# Patient Record
Sex: MALE | Race: OTHER
[De-identification: names, ages, dates, MRNs, and addresses within clinical notes are randomized per-mention and may not be internally consistent; named-entity substitution may affect disease eponyms.]

---

## 2019-02-28 PROBLEM — Z00.00 ENCOUNTER FOR PREVENTIVE HEALTH EXAMINATION: Status: ACTIVE | Noted: 2019-02-28

## 2019-03-08 ENCOUNTER — APPOINTMENT (OUTPATIENT)
Dept: HEMATOLOGY ONCOLOGY | Facility: CLINIC | Age: 74
End: 2019-03-08
Payer: MEDICARE

## 2019-03-08 VITALS
WEIGHT: 212 LBS | SYSTOLIC BLOOD PRESSURE: 126 MMHG | HEART RATE: 61 BPM | BODY MASS INDEX: 29.68 KG/M2 | DIASTOLIC BLOOD PRESSURE: 78 MMHG | OXYGEN SATURATION: 85 % | TEMPERATURE: 98 F | RESPIRATION RATE: 16 BRPM | HEIGHT: 71 IN

## 2019-03-08 DIAGNOSIS — Z80.8 FAMILY HISTORY OF MALIGNANT NEOPLASM OF OTHER ORGANS OR SYSTEMS: ICD-10-CM

## 2019-03-08 DIAGNOSIS — Z87.891 PERSONAL HISTORY OF NICOTINE DEPENDENCE: ICD-10-CM

## 2019-03-08 DIAGNOSIS — N40.0 BENIGN PROSTATIC HYPERPLASIA WITHOUT LOWER URINARY TRACT SYMPMS: ICD-10-CM

## 2019-03-08 DIAGNOSIS — Z86.79 PERSONAL HISTORY OF OTHER DISEASES OF THE CIRCULATORY SYSTEM: ICD-10-CM

## 2019-03-08 DIAGNOSIS — Z78.9 OTHER SPECIFIED HEALTH STATUS: ICD-10-CM

## 2019-03-08 DIAGNOSIS — Z87.898 PERSONAL HISTORY OF OTHER SPECIFIED CONDITIONS: ICD-10-CM

## 2019-03-08 DIAGNOSIS — K70.30 ALCOHOLIC CIRRHOSIS OF LIVER W/OUT ASCITES: ICD-10-CM

## 2019-03-08 PROCEDURE — 99205 OFFICE O/P NEW HI 60 MIN: CPT

## 2019-03-08 RX ORDER — PROPRANOLOL HYDROCHLORIDE 20 MG/1
20 TABLET ORAL
Refills: 0 | Status: ACTIVE | COMMUNITY

## 2019-03-08 RX ORDER — TAMSULOSIN HYDROCHLORIDE 0.4 MG/1
0.4 CAPSULE ORAL
Refills: 0 | Status: ACTIVE | COMMUNITY

## 2019-03-08 RX ORDER — ALBUTEROL SULFATE 90 UG/1
108 (90 BASE) AEROSOL, METERED RESPIRATORY (INHALATION)
Refills: 0 | Status: ACTIVE | COMMUNITY

## 2019-03-08 RX ORDER — RANITIDINE HYDROCHLORIDE 150 MG/1
150 CAPSULE ORAL
Refills: 0 | Status: ACTIVE | COMMUNITY

## 2019-03-08 RX ORDER — CHOLECALCIFEROL (VITAMIN D3) 50 MCG
2000 CAPSULE ORAL
Refills: 0 | Status: ACTIVE | COMMUNITY

## 2019-03-08 RX ORDER — UMECLIDINIUM BROMIDE AND VILANTEROL TRIFENATATE 62.5; 25 UG/1; UG/1
62.5-25 POWDER RESPIRATORY (INHALATION)
Refills: 0 | Status: ACTIVE | COMMUNITY

## 2019-03-08 NOTE — PHYSICAL EXAM
[Restricted in physically strenuous activity but ambulatory and able to carry out work of a light or sedentary nature] : Status 1- Restricted in physically strenuous activity but ambulatory and able to carry out work of a light or sedentary nature, e.g., light house work, office work [Normal] : affect appropriate [de-identified] : uses O2

## 2019-03-08 NOTE — HISTORY OF PRESENT ILLNESS
[de-identified] : This is a very pleasant 73-year-old gentleman with the below past medical history who was noted to be thrombocytopenic by his gastroenterologist prior to performing a screening colonoscopy. His history is significant for having developed alcoholic cirrhosis. Endoscopy performed on December 1, 2017 revealed small esophageal varices and mild portal gastropathy. He also underwent a CAT scan of the abdomen and pelvis on 11/2/18 that revealed a spleen measuring 17.1 cm with 2 hypoechoic structures one measuring 2.1 cm and no other one measuring 1.8 cm. These were compared to a CT of the chest dating back to 2014 at which point one of the lesions were visible and appear to be stable from the 2014 scan. He is now referred for the above-mentioned findings. [de-identified] : Mr. Johnson is here today for an initial consultation referred by . Per pt he is here today because there is blood in his urine. He uses O2 2L NC at home.

## 2019-03-08 NOTE — REVIEW OF SYSTEMS
[Fatigue] : fatigue [Vision Problems] : vision problems [Nosebleeds] : nosebleeds [Constipation] : constipation [SOB on Exertion] : shortness of breath during exertion [Negative] : Integumentary [FreeTextEntry3] : glasses [FreeTextEntry4] : occasional, uses oxygen NC [FreeTextEntry8] : frequency [FreeTextEntry1] : Tree Nut allergies

## 2019-03-08 NOTE — REASON FOR VISIT
[Initial Consultation] : an initial consultation [Friend] : friend [FreeTextEntry2] : thrombocytopenia.

## 2019-03-08 NOTE — ASSESSMENT
[FreeTextEntry1] : His thrombocytopenia is likely to be a multifactorial process. Contributing factors including his liver disease with passive congestion leading to splenomegaly and platelet sequestration. Chronic myelosuppression due to alcohol use may need additional factor. I am also unable to rule out a hypoproliferative bone marrow disorder at this time.\par I will obtain a CBC with differential and a peripheral blood smear review.\par I have initiated a complete hematological workup for the above.\par This will include a flow cytometry to rule out a lymphoproliferative disorder accounting for his splenomegaly.\par I will check his coagulation studies along with reflex mixing studies should these be abnormal.\par He will return to office in 2 weeks for followup and to review the above mentioned workup.\par \par Thank you very much for allowing me to participate in this gentleman his medical care. Should you have any questions or concerns please do not hesitate to call me directly.

## 2019-03-19 ENCOUNTER — OTHER (OUTPATIENT)
Age: 74
End: 2019-03-19

## 2019-03-21 ENCOUNTER — APPOINTMENT (OUTPATIENT)
Dept: HEMATOLOGY ONCOLOGY | Facility: CLINIC | Age: 74
End: 2019-03-21
Payer: MEDICARE

## 2019-03-21 VITALS
OXYGEN SATURATION: 90 % | DIASTOLIC BLOOD PRESSURE: 79 MMHG | BODY MASS INDEX: 29.26 KG/M2 | RESPIRATION RATE: 16 BRPM | WEIGHT: 209 LBS | HEIGHT: 71 IN | TEMPERATURE: 98 F | HEART RATE: 56 BPM | SYSTOLIC BLOOD PRESSURE: 136 MMHG

## 2019-03-21 PROCEDURE — 99214 OFFICE O/P EST MOD 30 MIN: CPT

## 2019-03-21 NOTE — REVIEW OF SYSTEMS
[Fatigue] : fatigue [Vision Problems] : vision problems [Nosebleeds] : nosebleeds [SOB on Exertion] : shortness of breath during exertion [Constipation] : constipation [Negative] : Heme/Lymph [FreeTextEntry3] : glasses [FreeTextEntry4] : occasional, uses oxygen NC [FreeTextEntry8] : frequency [FreeTextEntry1] : Tree Nut allergies

## 2019-03-21 NOTE — HISTORY OF PRESENT ILLNESS
[de-identified] : Mr. Johnson is here today for a follow up visit today. He offers no new complaints.  [0 - No Distress] : Distress Level: 0 [de-identified] : This is a very pleasant 73-year-old gentleman with the below past medical history who was noted to be thrombocytopenic by his gastroenterologist prior to performing a screening colonoscopy. His history is significant for having developed alcoholic cirrhosis. Endoscopy performed on December 1, 2017 revealed small esophageal varices and mild portal gastropathy. He also underwent a CAT scan of the abdomen and pelvis on 11/2/18 that revealed a spleen measuring 17.1 cm with 2 hypoechoic structures one measuring 2.1 cm and no other one measuring 1.8 cm. These were compared to a CT of the chest dating back to 2014 at which point one of the lesions were visible and appear to be stable from the 2014 scan. He is now referred for the above-mentioned findings.

## 2019-03-21 NOTE — REASON FOR VISIT
[Initial Consultation] : an initial consultation [Friend] : friend [Follow-Up Visit] : a follow-up visit for [Spouse] : spouse [FreeTextEntry2] : thrombocytopenia.

## 2019-03-21 NOTE — PHYSICAL EXAM
[Restricted in physically strenuous activity but ambulatory and able to carry out work of a light or sedentary nature] : Status 1- Restricted in physically strenuous activity but ambulatory and able to carry out work of a light or sedentary nature, e.g., light house work, office work [Normal] : affect appropriate [de-identified] : uses O2

## 2019-03-21 NOTE — ASSESSMENT
[FreeTextEntry1] : His thrombocytopenia is likely to be a multifactorial process. Contributing factors including his liver disease with passive congestion leading to splenomegaly and platelet sequestration. Chronic myelosuppression due to alcohol use may need additional factor. I am also unable to rule out a hypoproliferative bone marrow disorder at this time.\par I obtained a CBC with differential and a peripheral blood smear review.\par I initiated a complete hematological workup for the above.\par This included a flow cytometry to rule out a lymphoproliferative disorder accounting for his splenomegaly.\par The above w/u was essentially unremarkable.  Of note his transferrin saturation level were very high even with fasting labs.  An HFE analysis was ordered with today's blood work.\par I checked his coagulation studies, which too were within acceptable parameters.\par He may therefore proceed with his planned colonoscopy.\par He will return to office in 4 weeks for followup and labs.

## 2019-04-16 ENCOUNTER — APPOINTMENT (OUTPATIENT)
Dept: HEMATOLOGY ONCOLOGY | Facility: CLINIC | Age: 74
End: 2019-04-16
Payer: MEDICARE

## 2019-04-16 VITALS
WEIGHT: 208 LBS | HEIGHT: 71 IN | SYSTOLIC BLOOD PRESSURE: 163 MMHG | HEART RATE: 53 BPM | OXYGEN SATURATION: 88 % | DIASTOLIC BLOOD PRESSURE: 75 MMHG | RESPIRATION RATE: 18 BRPM | TEMPERATURE: 98 F | BODY MASS INDEX: 29.12 KG/M2

## 2019-04-16 PROCEDURE — 99214 OFFICE O/P EST MOD 30 MIN: CPT

## 2019-04-16 NOTE — HISTORY OF PRESENT ILLNESS
[0 - No Distress] : Distress Level: 0 [de-identified] : Mr. Johnson is here today for a follow up visit today. He offers no new complaints.  [de-identified] : This is a very pleasant 73-year-old gentleman with the below past medical history who was noted to be thrombocytopenic by his gastroenterologist prior to performing a screening colonoscopy. His history is significant for having developed alcoholic cirrhosis. Endoscopy performed on December 1, 2017 revealed small esophageal varices and mild portal gastropathy. He also underwent a CAT scan of the abdomen and pelvis on 11/2/18 that revealed a spleen measuring 17.1 cm with 2 hypoechoic structures one measuring 2.1 cm and no other one measuring 1.8 cm. These were compared to a CT of the chest dating back to 2014 at which point one of the lesions were visible and appear to be stable from the 2014 scan. He is now referred for the above-mentioned findings.

## 2019-04-16 NOTE — REVIEW OF SYSTEMS
[Fatigue] : fatigue [Vision Problems] : vision problems [SOB on Exertion] : shortness of breath during exertion [Nosebleeds] : nosebleeds [Constipation] : constipation [Negative] : Heme/Lymph [FreeTextEntry3] : glasses [FreeTextEntry4] : occasional, uses oxygen NC [FreeTextEntry8] : frequency [FreeTextEntry1] : Tree Nut allergies

## 2019-04-16 NOTE — ASSESSMENT
[FreeTextEntry1] : His thrombocytopenia is likely to be a multifactorial process. Contributing factors including his liver disease with passive congestion leading to splenomegaly and platelet sequestration. Chronic myelosuppression due to alcohol use may need additional factor. I am also unable to rule out a hypoproliferative bone marrow disorder at this time.\par I obtained a CBC with differential and a peripheral blood smear review.\par I initiated a complete hematological workup for the above.\par This included a flow cytometry to rule out a lymphoproliferative disorder accounting for his splenomegaly.\par The above w/u was essentially unremarkable.  Of note his transferrin saturation level were very high even with fasting labs.  An HFE analysis was ordered which was negative.\par If his iron and ferritin continue to be elevated than an MRI of the liver will be ordered for iron content.\par I checked his coagulation studies, which too were within acceptable parameters.\par He may therefore proceed with his planned colonoscopy.\par He will return to office in 4 weeks for followup and labs.

## 2019-04-16 NOTE — PHYSICAL EXAM
[Restricted in physically strenuous activity but ambulatory and able to carry out work of a light or sedentary nature] : Status 1- Restricted in physically strenuous activity but ambulatory and able to carry out work of a light or sedentary nature, e.g., light house work, office work [Normal] : affect appropriate [de-identified] : uses O2

## 2019-05-01 ENCOUNTER — APPOINTMENT (OUTPATIENT)
Dept: HEMATOLOGY ONCOLOGY | Facility: CLINIC | Age: 74
End: 2019-05-01
Payer: MEDICARE

## 2019-05-01 VITALS
WEIGHT: 213 LBS | BODY MASS INDEX: 29.82 KG/M2 | RESPIRATION RATE: 18 BRPM | OXYGEN SATURATION: 91 % | HEIGHT: 71 IN | TEMPERATURE: 98.1 F | DIASTOLIC BLOOD PRESSURE: 71 MMHG | HEART RATE: 55 BPM | SYSTOLIC BLOOD PRESSURE: 131 MMHG

## 2019-05-01 PROCEDURE — 99214 OFFICE O/P EST MOD 30 MIN: CPT

## 2019-05-01 RX ORDER — CHOLECALCIFEROL (VITAMIN D3) 50 MCG
2000 CAPSULE ORAL
Refills: 0 | Status: ACTIVE | COMMUNITY

## 2019-05-01 NOTE — PHYSICAL EXAM
[Restricted in physically strenuous activity but ambulatory and able to carry out work of a light or sedentary nature] : Status 1- Restricted in physically strenuous activity but ambulatory and able to carry out work of a light or sedentary nature, e.g., light house work, office work [Normal] : affect appropriate [de-identified] : uses O2

## 2019-05-01 NOTE — HISTORY OF PRESENT ILLNESS
[0 - No Distress] : Distress Level: 0 [de-identified] : Mr. Johnson is here today for a follow up visit today. He offers complaints of fatigue relieved by oxygen 2LNC.  [de-identified] : This is a very pleasant 74-year-old gentleman with the below past medical history who was noted to be thrombocytopenic by his gastroenterologist prior to performing a screening colonoscopy. His history is significant for having developed alcoholic cirrhosis. Endoscopy performed on December 1, 2017 revealed small esophageal varices and mild portal gastropathy. He also underwent a CAT scan of the abdomen and pelvis on 11/2/18 that revealed a spleen measuring 17.1 cm with 2 hypoechoic structures one measuring 2.1 cm and no other one measuring 1.8 cm. These were compared to a CT of the chest dating back to 2014 at which point one of the lesions were visible and appear to be stable from the 2014 scan. He is now referred for the above-mentioned findings.  He is here for f/u with labs.

## 2019-05-01 NOTE — REVIEW OF SYSTEMS
[Fatigue] : fatigue [Vision Problems] : vision problems [Nosebleeds] : nosebleeds [SOB on Exertion] : shortness of breath during exertion [Shortness Of Breath] : shortness of breath [Negative] : Gastrointestinal [FreeTextEntry6] : 2L NC oxygen [FreeTextEntry3] : glasses [FreeTextEntry4] : from oxygen. [FreeTextEntry1] : Tree Nut allergies [FreeTextEntry8] : frequency

## 2019-05-29 ENCOUNTER — APPOINTMENT (OUTPATIENT)
Dept: HEMATOLOGY ONCOLOGY | Facility: CLINIC | Age: 74
End: 2019-05-29
Payer: MEDICARE

## 2019-05-29 VITALS
RESPIRATION RATE: 18 BRPM | TEMPERATURE: 98.1 F | OXYGEN SATURATION: 92 % | BODY MASS INDEX: 29.4 KG/M2 | DIASTOLIC BLOOD PRESSURE: 67 MMHG | SYSTOLIC BLOOD PRESSURE: 126 MMHG | HEART RATE: 60 BPM | WEIGHT: 210 LBS | HEIGHT: 71 IN

## 2019-05-29 PROCEDURE — 99214 OFFICE O/P EST MOD 30 MIN: CPT

## 2019-05-29 NOTE — REVIEW OF SYSTEMS
[Fatigue] : fatigue [Vision Problems] : vision problems [SOB on Exertion] : shortness of breath during exertion [Negative] : ENT [FreeTextEntry6] : 2L NC oxygen [FreeTextEntry3] : glasses [FreeTextEntry1] : Tree Nut allergies

## 2019-05-29 NOTE — ASSESSMENT
[FreeTextEntry1] : His thrombocytopenia is likely to be a multifactorial process. Contributing factors including his liver disease with passive congestion leading to splenomegaly and platelet sequestration. Chronic myelosuppression due to alcohol use may need additional factor. I am also unable to rule out a hypoproliferative bone marrow disorder at this time.\par I obtained a CBC with differential and a peripheral blood smear review.\par I initiated a complete hematological workup for the above.\par This included a flow cytometry to rule out a lymphoproliferative disorder accounting for his splenomegaly.\par The above w/u was essentially unremarkable.  Of note his transferrin saturation level were very high even with fasting labs.  An HFE analysis was ordered which was negative.\par If his iron and ferritin continue to be elevated than an MRI of the liver will be ordered for iron content.\par I checked his coagulation studies, which too were within acceptable parameters.\par He may therefore proceed with his planned colonoscopy.\par He will return to office in 8 weeks for followup and labs as his counts remain stable and iron levels are acceptable.

## 2019-05-29 NOTE — HISTORY OF PRESENT ILLNESS
[0 - No Distress] : Distress Level: 0 [de-identified] : Mr. Johnson is here today for a follow up visit today. He offers complaints of fatigue relieved by oxygen 2LNC.  [de-identified] : This is a very pleasant 73-year-old gentleman with the below past medical history who was noted to be thrombocytopenic by his gastroenterologist prior to performing a screening colonoscopy. His history is significant for having developed alcoholic cirrhosis. Endoscopy performed on December 1, 2017 revealed small esophageal varices and mild portal gastropathy. He also underwent a CAT scan of the abdomen and pelvis on 11/2/18 that revealed a spleen measuring 17.1 cm with 2 hypoechoic structures one measuring 2.1 cm and no other one measuring 1.8 cm. These were compared to a CT of the chest dating back to 2014 at which point one of the lesions were visible and appear to be stable from the 2014 scan. He is now referred for the above-mentioned findings.

## 2019-05-29 NOTE — PHYSICAL EXAM
[Restricted in physically strenuous activity but ambulatory and able to carry out work of a light or sedentary nature] : Status 1- Restricted in physically strenuous activity but ambulatory and able to carry out work of a light or sedentary nature, e.g., light house work, office work [Normal] : affect appropriate [de-identified] : uses O2

## 2019-07-24 ENCOUNTER — APPOINTMENT (OUTPATIENT)
Dept: HEMATOLOGY ONCOLOGY | Facility: CLINIC | Age: 74
End: 2019-07-24
Payer: MEDICARE

## 2019-07-24 VITALS
OXYGEN SATURATION: 85 % | TEMPERATURE: 97.3 F | HEIGHT: 71 IN | RESPIRATION RATE: 18 BRPM | DIASTOLIC BLOOD PRESSURE: 71 MMHG | BODY MASS INDEX: 29.82 KG/M2 | SYSTOLIC BLOOD PRESSURE: 114 MMHG | WEIGHT: 213 LBS | HEART RATE: 59 BPM

## 2019-07-24 PROCEDURE — 99214 OFFICE O/P EST MOD 30 MIN: CPT

## 2019-07-24 RX ORDER — FUROSEMIDE 20 MG/1
20 TABLET ORAL
Refills: 0 | Status: ACTIVE | COMMUNITY

## 2019-07-24 NOTE — HISTORY OF PRESENT ILLNESS
[0 - No Distress] : Distress Level: 0 [de-identified] : This is a very pleasant 74-year-old gentleman with the below past medical history who was noted to be thrombocytopenic by his gastroenterologist prior to performing a screening colonoscopy. His history is significant for having developed alcoholic cirrhosis. Endoscopy performed on December 1, 2017 revealed small esophageal varices and mild portal gastropathy. He also underwent a CAT scan of the abdomen and pelvis on 11/2/18 that revealed a spleen measuring 17.1 cm with 2 hypoechoic structures one measuring 2.1 cm and no other one measuring 1.8 cm. These were compared to a CT of the chest dating back to 2014 at which point one of the lesions were visible and appear to be stable from the 2014 scan. He is now referred for the above-mentioned findings.  He is here for f/u with labs. [de-identified] : Mr. Johnson is here today for a follow up visit today. Presents today on 3L [place] O2, SpO2 91%. Complaints of fatigue. Colonoscopy still pending, appointment Monday with GI.

## 2019-07-24 NOTE — REVIEW OF SYSTEMS
[Fatigue] : fatigue [Vision Problems] : vision problems [SOB on Exertion] : shortness of breath during exertion [Constipation] : constipation [Negative] : Gastrointestinal [FreeTextEntry3] : glasses [FreeTextEntry6] : 3L NC oxygen [FreeTextEntry7] : lactulose prn [FreeTextEntry1] : Tree Nut allergies

## 2019-07-24 NOTE — PHYSICAL EXAM
[Restricted in physically strenuous activity but ambulatory and able to carry out work of a light or sedentary nature] : Status 1- Restricted in physically strenuous activity but ambulatory and able to carry out work of a light or sedentary nature, e.g., light house work, office work [Normal] : affect appropriate [de-identified] : uses O2

## 2019-09-25 ENCOUNTER — APPOINTMENT (OUTPATIENT)
Dept: HEMATOLOGY ONCOLOGY | Facility: CLINIC | Age: 74
End: 2019-09-25
Payer: MEDICARE

## 2019-09-25 VITALS
OXYGEN SATURATION: 92 % | BODY MASS INDEX: 30.38 KG/M2 | WEIGHT: 217 LBS | HEART RATE: 61 BPM | HEIGHT: 71 IN | DIASTOLIC BLOOD PRESSURE: 77 MMHG | SYSTOLIC BLOOD PRESSURE: 124 MMHG | TEMPERATURE: 97.7 F | RESPIRATION RATE: 18 BRPM

## 2019-09-25 PROCEDURE — 99214 OFFICE O/P EST MOD 30 MIN: CPT

## 2019-09-25 NOTE — HISTORY OF PRESENT ILLNESS
[0 - No Distress] : Distress Level: 0 [de-identified] : This is a very pleasant 74-year-old gentleman with the below past medical history who was noted to be thrombocytopenic by his gastroenterologist prior to performing a screening colonoscopy. His history is significant for having developed alcoholic cirrhosis. Endoscopy performed on December 1, 2017 revealed small esophageal varices and mild portal gastropathy. He also underwent a CAT scan of the abdomen and pelvis on 11/2/18 that revealed a spleen measuring 17.1 cm with 2 hypoechoic structures one measuring 2.1 cm and no other one measuring 1.8 cm. These were compared to a CT of the chest dating back to 2014 at which point one of the lesions were visible and appear to be stable from the 2014 scan. He is now referred for the above-mentioned findings.  He is here for f/u with labs. [de-identified] : Mr. Johnson is here today for a follow up visit today. Presents today on 3L [place] O2, SpO2 91%. Complaints of fatigue. Colonoscopy he reports showed no pathology.

## 2019-09-25 NOTE — PHYSICAL EXAM
[Restricted in physically strenuous activity but ambulatory and able to carry out work of a light or sedentary nature] : Status 1- Restricted in physically strenuous activity but ambulatory and able to carry out work of a light or sedentary nature, e.g., light house work, office work [Normal] : affect appropriate [de-identified] : uses O2

## 2019-09-25 NOTE — ASSESSMENT
[FreeTextEntry1] : His thrombocytopenia is likely to be a multifactorial process. Contributing factors including his liver disease with passive congestion leading to splenomegaly and platelet sequestration. Chronic myelosuppression due to alcohol use may need additional factor. I am also unable to rule out a hypoproliferative bone marrow disorder at this time.\par I obtained a CBC with differential and a peripheral blood smear review.\par I initiated a complete hematological workup for the above.\par This included a flow cytometry to rule out a lymphoproliferative disorder accounting for his splenomegaly.\par The above w/u was essentially unremarkable.  Of note his transferrin saturation level were very high even with fasting labs.  An HFE analysis was ordered which was negative.\par If his iron and ferritin continue to be elevated than an MRI of the liver will be ordered for iron content.\par I checked his coagulation studies, which too were within acceptable parameters.\par He reports that a recent colonoscopy showed no pathology.

## 2019-09-25 NOTE — REVIEW OF SYSTEMS
[Fatigue] : fatigue [SOB on Exertion] : shortness of breath during exertion [Vision Problems] : vision problems [Constipation] : constipation [Negative] : Heme/Lymph [FreeTextEntry3] : glasses [FreeTextEntry6] : 3L NC oxygen [FreeTextEntry1] : Tree Nut allergies [FreeTextEntry7] : lactulose prn

## 2019-12-03 ENCOUNTER — APPOINTMENT (OUTPATIENT)
Dept: HEMATOLOGY ONCOLOGY | Facility: CLINIC | Age: 74
End: 2019-12-03
Payer: MEDICARE

## 2019-12-17 ENCOUNTER — APPOINTMENT (OUTPATIENT)
Dept: HEMATOLOGY ONCOLOGY | Facility: CLINIC | Age: 74
End: 2019-12-17
Payer: MEDICARE

## 2019-12-17 VITALS
OXYGEN SATURATION: 94 % | DIASTOLIC BLOOD PRESSURE: 69 MMHG | WEIGHT: 216 LBS | HEART RATE: 57 BPM | TEMPERATURE: 98.8 F | HEIGHT: 71 IN | SYSTOLIC BLOOD PRESSURE: 128 MMHG | BODY MASS INDEX: 30.24 KG/M2 | RESPIRATION RATE: 18 BRPM

## 2019-12-17 PROCEDURE — 99214 OFFICE O/P EST MOD 30 MIN: CPT

## 2019-12-17 NOTE — REVIEW OF SYSTEMS
[FreeTextEntry3] : glasses [Vision Problems] : no vision problems [FreeTextEntry6] : 2L NC oxygen [FreeTextEntry1] : Tree Nut allergies [FreeTextEntry7] : lactulose prn

## 2019-12-17 NOTE — HISTORY OF PRESENT ILLNESS
[de-identified] : This is a very pleasant 74-year-old gentleman with the below past medical history who was noted to be thrombocytopenic by his gastroenterologist prior to performing a screening colonoscopy. His history is significant for having developed alcoholic cirrhosis. Endoscopy performed on December 1, 2017 revealed small esophageal varices and mild portal gastropathy. He also underwent a CAT scan of the abdomen and pelvis on 11/2/18 that revealed a spleen measuring 17.1 cm with 2 hypoechoic structures one measuring 2.1 cm and no other one measuring 1.8 cm. These were compared to a CT of the chest dating back to 2014 at which point one of the lesions were visible and appear to be stable from the 2014 scan. He is now referred for the above-mentioned findings.  He is here for f/u with labs. [de-identified] : Mr. Johnson is here today for a follow up visit today. Pulmonary consult last week. 2L NC O2 at this time. Started a "water pill" for which he cannot recall the name.

## 2019-12-17 NOTE — ASSESSMENT
[FreeTextEntry1] : His thrombocytopenia is likely to be a multifactorial process. Contributing factors including his liver disease with passive congestion leading to splenomegaly and platelet sequestration. Chronic myelosuppression due to alcohol use may need additional factor. I am also unable to rule out a hypoproliferative bone marrow disorder at this time.\par I obtained a CBC with differential and a peripheral blood smear review.\par I initiated a complete hematological workup for the above.\par This included a flow cytometry to rule out a lymphoproliferative disorder accounting for his splenomegaly.\par The above w/u was essentially unremarkable.  Of note his transferrin saturation level were very high even with fasting labs.  An HFE analysis was ordered which was negative.\par If his iron and ferritin continue to be elevated than an MRI of the liver will be ordered for iron content.  He is asked to fast before his next visit so that fasting iron levels and ferritin can be perormed.\par I checked his coagulation studies, which too were within acceptable parameters.\par He reports that a recent colonoscopy showed no pathology.

## 2020-02-12 ENCOUNTER — APPOINTMENT (OUTPATIENT)
Dept: HEMATOLOGY ONCOLOGY | Facility: CLINIC | Age: 75
End: 2020-02-12
Payer: MEDICARE

## 2020-02-12 ENCOUNTER — APPOINTMENT (OUTPATIENT)
Dept: HEMATOLOGY ONCOLOGY | Facility: CLINIC | Age: 75
End: 2020-02-12

## 2020-02-12 VITALS
OXYGEN SATURATION: 98 % | RESPIRATION RATE: 18 BRPM | BODY MASS INDEX: 29.68 KG/M2 | DIASTOLIC BLOOD PRESSURE: 74 MMHG | SYSTOLIC BLOOD PRESSURE: 127 MMHG | TEMPERATURE: 97.4 F | HEART RATE: 61 BPM | HEIGHT: 71 IN | WEIGHT: 212 LBS

## 2020-02-12 DIAGNOSIS — R16.1 SPLENOMEGALY, NOT ELSEWHERE CLASSIFIED: ICD-10-CM

## 2020-02-12 DIAGNOSIS — D68.9 COAGULATION DEFECT, UNSPECIFIED: ICD-10-CM

## 2020-02-12 DIAGNOSIS — D69.6 THROMBOCYTOPENIA, UNSPECIFIED: ICD-10-CM

## 2020-02-12 DIAGNOSIS — R79.89 OTHER SPECIFIED ABNORMAL FINDINGS OF BLOOD CHEMISTRY: ICD-10-CM

## 2020-02-12 PROCEDURE — 99214 OFFICE O/P EST MOD 30 MIN: CPT

## 2020-02-12 NOTE — PHYSICAL EXAM
[Restricted in physically strenuous activity but ambulatory and able to carry out work of a light or sedentary nature] : Status 1- Restricted in physically strenuous activity but ambulatory and able to carry out work of a light or sedentary nature, e.g., light house work, office work [Normal] : affect appropriate [de-identified] : uses O2

## 2020-02-12 NOTE — ASSESSMENT
[FreeTextEntry1] : His thrombocytopenia is likely to be a multifactorial process. Contributing factors including his liver disease with passive congestion leading to splenomegaly and platelet sequestration. Chronic myelosuppression due to alcohol use may need additional factor. I am also unable to rule out a hypoproliferative bone marrow disorder at this time.\par I obtained a CBC with differential and a peripheral blood smear review.\par I initiated a complete hematological workup for the above.\par This included a flow cytometry to rule out a lymphoproliferative disorder accounting for his splenomegaly.\par The above w/u was essentially unremarkable.  Of note his transferrin saturation level were very high even with fasting labs.  An HFE analysis was ordered which was negative.\par If his iron and ferritin continue to be elevated than an MRI of the liver will be ordered for iron content.  He is asked to fast before his next visit so that fasting iron levels and ferritin can be performed.\par I checked his coagulation studies, which too were within acceptable parameters.\par He reports that a recent colonoscopy showed no pathology.  EGD is being scheduled.\par RTO 2 months for f/u with labs.

## 2020-02-12 NOTE — HISTORY OF PRESENT ILLNESS
[0 - No Distress] : Distress Level: 0 [de-identified] : This is a very pleasant 74-year-old gentleman with the below past medical history who was noted to be thrombocytopenic by his gastroenterologist prior to performing a screening colonoscopy. His history is significant for having developed alcoholic cirrhosis. Endoscopy performed on December 1, 2017 revealed small esophageal varices and mild portal gastropathy. He also underwent a CAT scan of the abdomen and pelvis on 11/2/18 that revealed a spleen measuring 17.1 cm with 2 hypoechoic structures one measuring 2.1 cm and no other one measuring 1.8 cm. These were compared to a CT of the chest dating back to 2014 at which point one of the lesions were visible and appear to be stable from the 2014 scan. He is now referred for the above-mentioned findings.  He is here for f/u with labs. [de-identified] : Mr. Johnson is here today for a follow up visit today. He has yet to schedule endoscopy.

## 2020-02-12 NOTE — REVIEW OF SYSTEMS
[Fatigue] : fatigue [SOB on Exertion] : shortness of breath during exertion [Negative] : Heme/Lymph [Vision Problems] : no vision problems [FreeTextEntry3] : glasses [FreeTextEntry1] : Tree Nut allergies [FreeTextEntry6] : 3L NC oxygen dependent

## 2020-04-29 ENCOUNTER — APPOINTMENT (OUTPATIENT)
Dept: HEMATOLOGY ONCOLOGY | Facility: CLINIC | Age: 75
End: 2020-04-29